# Patient Record
Sex: MALE | Race: WHITE | Employment: UNEMPLOYED | URBAN - METROPOLITAN AREA
[De-identification: names, ages, dates, MRNs, and addresses within clinical notes are randomized per-mention and may not be internally consistent; named-entity substitution may affect disease eponyms.]

---

## 2017-12-12 ENCOUNTER — TELEPHONE (OUTPATIENT)
Dept: ONCOLOGY | Facility: CLINIC | Age: 37
End: 2017-12-12

## 2017-12-12 NOTE — TELEPHONE ENCOUNTER
Called pt to inform him of below recommendation.  He voiced understanding.  Message sent to schedulers to get him added on.

## 2017-12-12 NOTE — TELEPHONE ENCOUNTER
----- Message from Sonido Dunham MD sent at 12/12/2017 11:20 AM CST -----  Regarding: RE: new lump  Have the schedulers give him one of the spots next Thursday, December 21, that are on hold. These are on hold for established patients that need to be seen - and he qualifies.    If the area becomes reddened and tender, like infection, he should be seen by his PCP or an ED.    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  It started out the size of a small almond, but now is the size of an olive.  He did have a cold last week, but his cold started after he noticed the lump.  About 3-4 days ago, the area became tender and black and blue.     Looks like he hasn't seen you in over a year.  Please advise.     Thanks,  Maria Ines Taylor, RN  [facility] Triage  [phone]